# Patient Record
Sex: MALE | Race: AMERICAN INDIAN OR ALASKA NATIVE | Employment: STUDENT | URBAN - METROPOLITAN AREA
[De-identification: names, ages, dates, MRNs, and addresses within clinical notes are randomized per-mention and may not be internally consistent; named-entity substitution may affect disease eponyms.]

---

## 2017-03-12 RX ORDER — SIMVASTATIN 40 MG/1
TABLET, FILM COATED ORAL
Qty: 90 TAB | Refills: 2 | Status: SHIPPED | OUTPATIENT
Start: 2017-03-12 | End: 2017-08-24 | Stop reason: SDUPTHER

## 2017-03-21 ENCOUNTER — TELEPHONE (OUTPATIENT)
Dept: FAMILY MEDICINE CLINIC | Age: 52
End: 2017-03-21

## 2017-03-21 NOTE — TELEPHONE ENCOUNTER
Pt is calling to inform provider that he is scheduled to get knee injections at the beginning of April. Pt stated that the last time he had injections that his sugar went up and he wants to prevent this from happening again. So he would like to get a plan of action.

## 2017-03-23 NOTE — TELEPHONE ENCOUNTER
Spoke with patient and per [employer] advice, I informed him that after his injections he should closely monitor his blood sugar and that if it consistently stays above 200 to call Dr. Hansel Arellano. Patient verbally understood and states that injection is in April.

## 2017-04-20 ENCOUNTER — HOSPITAL ENCOUNTER (OUTPATIENT)
Dept: LAB | Age: 52
Discharge: HOME | End: 2017-04-20
Payer: OTHER GOVERNMENT

## 2017-04-20 ENCOUNTER — OFFICE VISIT (OUTPATIENT)
Dept: FAMILY MEDICINE CLINIC | Age: 52
End: 2017-04-20

## 2017-04-20 VITALS
DIASTOLIC BLOOD PRESSURE: 78 MMHG | BODY MASS INDEX: 25.62 KG/M2 | OXYGEN SATURATION: 94 % | HEIGHT: 76 IN | WEIGHT: 210.4 LBS | HEART RATE: 73 BPM | RESPIRATION RATE: 16 BRPM | SYSTOLIC BLOOD PRESSURE: 118 MMHG | TEMPERATURE: 98.1 F

## 2017-04-20 DIAGNOSIS — E78.00 HYPERCHOLESTEROLEMIA: ICD-10-CM

## 2017-04-20 DIAGNOSIS — I10 HTN (HYPERTENSION), BENIGN: ICD-10-CM

## 2017-04-20 DIAGNOSIS — E11.9 DIABETES MELLITUS WITHOUT COMPLICATION (HCC): Primary | ICD-10-CM

## 2017-04-20 DIAGNOSIS — E11.9 DIABETES MELLITUS WITHOUT COMPLICATION (HCC): ICD-10-CM

## 2017-04-20 LAB
ALT SERPL-CCNC: 27 U/L (ref 16–61)
ANION GAP BLD CALC-SCNC: 10 MMOL/L (ref 3–18)
AST SERPL W P-5'-P-CCNC: 11 U/L (ref 15–37)
BUN SERPL-MCNC: 12 MG/DL (ref 7–18)
BUN/CREAT SERPL: 16 (ref 12–20)
CALCIUM SERPL-MCNC: 9.6 MG/DL (ref 8.5–10.1)
CHLORIDE SERPL-SCNC: 103 MMOL/L (ref 100–108)
CHOLEST SERPL-MCNC: 159 MG/DL
CO2 SERPL-SCNC: 24 MMOL/L (ref 21–32)
CREAT SERPL-MCNC: 0.75 MG/DL (ref 0.6–1.3)
EST. AVERAGE GLUCOSE BLD GHB EST-MCNC: 272 MG/DL
GLUCOSE SERPL-MCNC: 249 MG/DL (ref 74–99)
HBA1C MFR BLD: 11.1 % (ref 4.2–5.6)
HDLC SERPL-MCNC: 33 MG/DL (ref 40–60)
HDLC SERPL: 4.8 {RATIO} (ref 0–5)
LDLC SERPL CALC-MCNC: 90 MG/DL (ref 0–100)
LIPID PROFILE,FLP: ABNORMAL
POTASSIUM SERPL-SCNC: 4.3 MMOL/L (ref 3.5–5.5)
SODIUM SERPL-SCNC: 137 MMOL/L (ref 136–145)
TRIGL SERPL-MCNC: 180 MG/DL (ref ?–150)
VLDLC SERPL CALC-MCNC: 36 MG/DL

## 2017-04-20 PROCEDURE — 80048 BASIC METABOLIC PNL TOTAL CA: CPT | Performed by: FAMILY MEDICINE

## 2017-04-20 PROCEDURE — 80061 LIPID PANEL: CPT | Performed by: FAMILY MEDICINE

## 2017-04-20 PROCEDURE — 84450 TRANSFERASE (AST) (SGOT): CPT | Performed by: FAMILY MEDICINE

## 2017-04-20 PROCEDURE — 84460 ALANINE AMINO (ALT) (SGPT): CPT | Performed by: FAMILY MEDICINE

## 2017-04-20 PROCEDURE — 83036 HEMOGLOBIN GLYCOSYLATED A1C: CPT | Performed by: FAMILY MEDICINE

## 2017-04-20 RX ORDER — MULTIVITAMIN
TABLET ORAL
COMMUNITY

## 2017-04-20 NOTE — PROGRESS NOTES
1. Have you been to the ER, urgent care clinic since your last visit? Hospitalized since your last visit? No    2. Have you seen or consulted any other health care providers outside of the [address] [place] [address] since your last visit? Include any pap smears or colon screening.  No

## 2017-04-20 NOTE — MR AVS SNAPSHOT
Visit Information Date & Time Provider Department Dept. Phone Encounter #  
 4/20/2017  9:00 AM Carla Bajwa, [address] [id number] Follow-up Instructions Return in about 4 months (around 8/20/2017) for htn/dm/chol. Upcoming Health Maintenance Date Due  
 EYE EXAM RETINAL OR DILATED Q1 12/4/2016 FOOT EXAM Q1 2/22/2017 HEMOGLOBIN A1C Q6M 6/22/2017 MICROALBUMIN Q1 12/22/2017 LIPID PANEL Q1 12/22/2017 COLONOSCOPY 1/29/2019 Pneumococcal 19-64 Highest Risk (3 of 3 - PPSV23) 10/4/2021 DTaP/Tdap/Td series (2 - Td) 4/22/2025 Allergies as of 4/20/2017  Review Complete On: 4/20/2017 By: Shamika Acevedo LPN No Known Allergies Current Immunizations  Reviewed on 10/22/2015 Name Date Influenza Vaccine 8/9/2015, 10/18/2014, 10/2/2013 Pneumococcal Conjugate (PCV-13) 6/22/2016 Tdap 4/22/2015 Zoster Vaccine, Live 8/9/2015 Not reviewed this visit You Were Diagnosed With   
  
 Codes Comments Diabetes mellitus without complication ([facility] [address]    -  Primary ICD-10-CM: E11.9 ICD-9-CM: 250.00 Hypercholesterolemia     ICD-10-CM: E78.00 ICD-9-CM: 272.0   
 HTN (hypertension), benign     ICD-10-CM: I10 
ICD-9-CM: 401.1 Vitals BP Pulse Temp Resp Height(growth percentile) Weight(growth percentile) 118/78 (BP 1 Location: Left arm, BP Patient Position: Sitting) 73 98.1 °F (36.7 °C) (Oral) 16 6' 4\" (1.93 m) 210 lb 6.4 oz (95.4 kg) SpO2 BMI Smoking Status 94% 25.61 kg/m2 Former Smoker BMI and BSA Data Body Mass Index Body Surface Area  
 25.61 kg/m 2 2.26 m 2 Preferred Pharmacy Pharmacy Name Phone [facility] [phone] Your Updated Medication List  
  
   
This list is accurate as of: 4/20/17  9:43 AM.  Always use your most recent med list.  
  
  
  
  
 aspirin delayed-release 81 mg tablet Take 1 Tab by mouth daily. Blood-Glucose Meter monitoring kit Blood sugar check daily CINNAMON 500 mg Cap Generic drug:  cinnamon bark Take  by mouth. FISH  mg Cap Generic drug:  Omega-3 Fatty Acids Take  by mouth.  
  
 gabapentin 300 mg capsule Commonly known as:  NEURONTIN Take 300 mg by mouth daily. glucose blood VI test strips strip Commonly known as:  ASCENSIA AUTODISC VI, ONE TOUCH ULTRA TEST VI Check blood glucose daily. HYDROcodone-acetaminophen 7.5-325 mg per tablet Commonly known as:  Marlaine Oliver Take 1 Tab by mouth four (4) times daily as needed for Pain. Max Daily Amount: 4 Tabs. Indications: PAIN  
  
 insulin glargine 100 unit/mL (3 mL) pen Commonly known as:  LANTUS SOLOSTAR  
30 units subcu once daily as directed ( per [place]) Insulin Needles (Disposable) 31 gauge x 3/16\" Ndle Commonly known as:  BD INSULIN PEN NEEDLE UF MINI To be used as directed with Solostar Lantus pen. * Lancets Misc Check blood glucose daily * Lancets Misc Blood sugar check daily  
  
 metFORMIN 500 mg tablet Commonly known as:  GLUCOPHAGE Take 1 Tab by mouth two (2) times daily (with meals). [address] Take  by mouth.  
  
 multivitamin tablet Commonly known as:  ONE A DAY Take 1 Tab by mouth daily. sildenafil citrate 50 mg tablet Commonly known as:  VIAGRA Take 1 Tab by mouth as needed. simvastatin 40 mg tablet Commonly known as:  ZOCOR  
TAKE 1 TABLET NIGHTLY * Notice: This list has 2 medication(s) that are the same as other medications prescribed for you. Read the directions carefully, and ask your doctor or other care provider to review them with you. Follow-up Instructions Return in about 4 months (around 8/20/2017) for htn/dm/chol. To-Do List   
 04/27/2017 Lab:  ALT   
  
 04/27/2017 Lab:  AST   
  
 04/27/2017   Lab:  HEMOGLOBIN A1C WITH EAG   
  
 04/27/2017 Lab:  LIPID PANEL   
  
 04/27/2017 Lab:  METABOLIC PANEL, BASIC Introducing [facility]! Dear Laureano Porras: 
Thank you for requesting a [employer] account. Our records indicate that you already have an active [employer] account. You can access your account anytime at [email or web address] Did you know that you can access your hospital and ER discharge instructions at any time in [employer]? You can also review all of your test results from your hospital stay or ER visit. Additional Information If you have questions, please visit the Frequently Asked Questions section of the [employer] website at [email or web address] Remember, [employer] is NOT to be used for urgent needs. For medical emergencies, dial 911. Now available from your iPhone and Android! Please provide this summary of care documentation to your next provider. Your primary care clinician is listed as [address]. If you have any questions after today's visit, please call [phone].

## 2017-04-22 NOTE — PROGRESS NOTES
HISTORY OF PRESENT ILLNESS  Grady Blair is a 46 y.o. male. HPI     Pt here to follow up on DM/Chol/HTN:  DM: He has noted that his blood sugars are more elevated recently. He has had some steroid injections for his back recently. Thinks this may have contributed to this; Pt has lost some weight:  Thinks he has changed his diet to promote this. Pt previously under the care of endocrine. Hyperchol:  Pt is on zocor; he tolerates med well; Pt complies with treatment regimen. HTN:  BP is stable; he is not on meds specifically for BP. Pt has no current sx relative to HTN. Current Outpatient Prescriptions:     cinnamon bark (CINNAMON) 500 mg cap, Take  by mouth., Disp: , Rfl:     simvastatin (ZOCOR) 40 mg tablet, TAKE 1 TABLET NIGHTLY, Disp: 90 Tab, Rfl: 2    gabapentin (NEURONTIN) 300 mg capsule, Take 300 mg by mouth daily. , Disp: , Rfl:     Blood-Glucose Meter monitoring kit, Blood sugar check daily, Disp: 1 Kit, Rfl: 0    glucose blood VI test strips (ASCENSIA AUTODISC VI, ONE TOUCH ULTRA TEST VI) strip, Check blood glucose daily. , Disp: 100 Strip, Rfl: 4    metFORMIN (GLUCOPHAGE) 500 mg tablet, Take 1 Tab by mouth two (2) times daily (with meals). , Disp: 180 Tab, Rfl: 3    insulin glargine (LANTUS SOLOSTAR) 100 unit/mL (3 mL) pen, 30 units subcu once daily as directed ( per Mathieu), Disp: 3 Each, Rfl: 3    aspirin delayed-release 81 mg tablet, Take 1 Tab by mouth daily. , Disp: 90 Tab, Rfl: 3    sildenafil citrate (VIAGRA) 50 mg tablet, Take 1 Tab by mouth as needed. , Disp: 4 Tab, Rfl: 6    Lancets misc, Blood sugar check daily, Disp: 1 Each, Rfl: 11    Insulin Needles, Disposable, (BD INSULIN PEN NEEDLE UF MINI) 31 gauge x 3/16\" ndle, To be used as directed with Solostar Lantus pen., Disp: 90 Pen Needle, Rfl: 3    GLUCOSAM/CHOND/HYALU/CF BORATE (MOVE FREE [facility] PO), Take  by mouth., Disp: , Rfl:     FISH  mg cap, Take  by mouth., Disp: , Rfl:     Lancets misc, Check blood glucose daily, Disp: 3 Package, Rfl: 3    multivitamin (ONE A DAY) tablet, Take 1 Tab by mouth daily. , Disp: , Rfl:       Review of Systems   Constitutional: Negative for chills and fever. Cardiovascular: Negative for chest pain and palpitations. Physical Exam   Constitutional: He appears well-developed and well-nourished. No distress. Cardiovascular: Normal rate and regular rhythm. Exam reveals no gallop and no friction rub. No murmur heard. Pulmonary/Chest: Breath sounds normal. No respiratory distress. He has no wheezes. He has no rales. Musculoskeletal: He exhibits no edema. Nursing note and vitals reviewed. ASSESSMENT and PLAN    ICD-10-CM ICD-9-CM    1. Diabetes mellitus without complication (HCC) P73.0 042.25 METABOLIC PANEL, BASIC      HEMOGLOBIN A1C WITH EAG   2. Hypercholesterolemia E78.00 272.0 ALT      AST      LIPID PANEL   3. HTN (hypertension), benign I10 401.1        As above,   above all stable unless otherwise noted   treatment plan as listed below  Orders Placed This Encounter    ALT    AST    LIPID PANEL    METABOLIC PANEL, BASIC    HEMOGLOBIN A1C WITH EAG    cinnamon bark (CINNAMON) 500 mg cap   Follow-up Disposition:  Return in about 4 months (around 8/20/2017) for htn/dm/chol. An After Visit Summary was printed and given to the patient. This has been fully explained to the patient, who indicates understanding. Addendum: pt not on ACE Inhibitor for uncertain reasons. Will have to add.

## 2017-08-24 ENCOUNTER — OFFICE VISIT (OUTPATIENT)
Dept: FAMILY MEDICINE CLINIC | Age: 52
End: 2017-08-24

## 2017-08-24 ENCOUNTER — HOSPITAL ENCOUNTER (OUTPATIENT)
Dept: LAB | Age: 52
Discharge: HOME | End: 2017-08-24
Payer: OTHER GOVERNMENT

## 2017-08-24 VITALS
OXYGEN SATURATION: 97 % | HEART RATE: 72 BPM | WEIGHT: 211 LBS | TEMPERATURE: 97.9 F | BODY MASS INDEX: 25.69 KG/M2 | RESPIRATION RATE: 16 BRPM | DIASTOLIC BLOOD PRESSURE: 80 MMHG | HEIGHT: 76 IN | SYSTOLIC BLOOD PRESSURE: 108 MMHG

## 2017-08-24 DIAGNOSIS — I10 HTN (HYPERTENSION) WITH GOAL TO BE DETERMINED: ICD-10-CM

## 2017-08-24 DIAGNOSIS — E11.9 DIABETES MELLITUS WITHOUT COMPLICATION (HCC): Primary | ICD-10-CM

## 2017-08-24 DIAGNOSIS — E11.9 DIABETES MELLITUS WITHOUT COMPLICATION (HCC): ICD-10-CM

## 2017-08-24 DIAGNOSIS — E78.00 HYPERCHOLESTEROLEMIA: ICD-10-CM

## 2017-08-24 LAB
ALT SERPL-CCNC: 26 U/L (ref 16–61)
ANION GAP SERPL CALC-SCNC: 8 MMOL/L (ref 3–18)
AST SERPL-CCNC: 16 U/L (ref 15–37)
BUN SERPL-MCNC: 16 MG/DL (ref 7–18)
BUN/CREAT SERPL: 19 (ref 12–20)
CALCIUM SERPL-MCNC: 9 MG/DL (ref 8.5–10.1)
CHLORIDE SERPL-SCNC: 107 MMOL/L (ref 100–108)
CHOLEST SERPL-MCNC: 139 MG/DL
CO2 SERPL-SCNC: 26 MMOL/L (ref 21–32)
CREAT SERPL-MCNC: 0.86 MG/DL (ref 0.6–1.3)
EST. AVERAGE GLUCOSE BLD GHB EST-MCNC: 169 MG/DL
GLUCOSE SERPL-MCNC: 96 MG/DL (ref 74–99)
HBA1C MFR BLD: 7.5 % (ref 4.2–5.6)
HDLC SERPL-MCNC: 42 MG/DL (ref 40–60)
HDLC SERPL: 3.3 {RATIO} (ref 0–5)
LDLC SERPL CALC-MCNC: 78 MG/DL (ref 0–100)
LIPID PROFILE,FLP: NORMAL
POTASSIUM SERPL-SCNC: 4.6 MMOL/L (ref 3.5–5.5)
SODIUM SERPL-SCNC: 141 MMOL/L (ref 136–145)
TRIGL SERPL-MCNC: 95 MG/DL (ref ?–150)
VLDLC SERPL CALC-MCNC: 19 MG/DL

## 2017-08-24 PROCEDURE — 80061 LIPID PANEL: CPT | Performed by: FAMILY MEDICINE

## 2017-08-24 PROCEDURE — 80048 BASIC METABOLIC PNL TOTAL CA: CPT | Performed by: FAMILY MEDICINE

## 2017-08-24 PROCEDURE — 84450 TRANSFERASE (AST) (SGOT): CPT | Performed by: FAMILY MEDICINE

## 2017-08-24 PROCEDURE — 83036 HEMOGLOBIN GLYCOSYLATED A1C: CPT | Performed by: FAMILY MEDICINE

## 2017-08-24 PROCEDURE — 84460 ALANINE AMINO (ALT) (SGPT): CPT | Performed by: FAMILY MEDICINE

## 2017-08-24 PROCEDURE — 36415 COLL VENOUS BLD VENIPUNCTURE: CPT | Performed by: FAMILY MEDICINE

## 2017-08-24 RX ORDER — METFORMIN HYDROCHLORIDE 500 MG/1
500 TABLET ORAL 2 TIMES DAILY WITH MEALS
Qty: 180 TAB | Refills: 3 | Status: SHIPPED | OUTPATIENT
Start: 2017-08-24 | End: 2018-08-22 | Stop reason: SDUPTHER

## 2017-08-24 RX ORDER — ASPIRIN 81 MG/1
81 TABLET ORAL DAILY
Qty: 90 TAB | Refills: 3 | Status: SHIPPED | OUTPATIENT
Start: 2017-08-24 | End: 2018-07-20 | Stop reason: SDUPTHER

## 2017-08-24 RX ORDER — PEN NEEDLE, DIABETIC 31 GX3/16"
NEEDLE, DISPOSABLE MISCELLANEOUS
Qty: 90 PEN NEEDLE | Refills: 3 | Status: SHIPPED | OUTPATIENT
Start: 2017-08-24 | End: 2018-07-20 | Stop reason: SDUPTHER

## 2017-08-24 RX ORDER — INSULIN GLARGINE 100 [IU]/ML
35 INJECTION, SOLUTION SUBCUTANEOUS
Qty: 3 UNITS | Refills: 6 | Status: SHIPPED | OUTPATIENT
Start: 2017-08-24 | End: 2018-01-24 | Stop reason: ALTCHOICE

## 2017-08-24 RX ORDER — SILDENAFIL 50 MG/1
50 TABLET, FILM COATED ORAL AS NEEDED
Qty: 4 TAB | Refills: 6 | Status: SHIPPED | OUTPATIENT
Start: 2017-08-24 | End: 2018-07-20 | Stop reason: SDUPTHER

## 2017-08-24 RX ORDER — SIMVASTATIN 40 MG/1
TABLET, FILM COATED ORAL
Qty: 90 TAB | Refills: 3 | Status: SHIPPED | OUTPATIENT
Start: 2017-08-24 | End: 2018-08-22 | Stop reason: SDUPTHER

## 2017-08-24 NOTE — PROGRESS NOTES
1. Have you been to the ER, urgent care clinic since your last visit? Hospitalized since your last visit? No    2. Have you seen or consulted any other health care providers outside of the [address] [place] [address] since your last visit? Include any pap smears or colon screening.  No

## 2017-08-24 NOTE — PROGRESS NOTES
HISTORY OF PRESENT ILLNESS  Rehana Barnes is a 46 y.o. male. HPI  Patient is here today for evaluation and treatment of: Diabetes/Hypertension/Cholesterol problem    Diabetes: Pt 's insulin dose was increased to 35 units at last visit. Lab Results   Component Value Date/Time    Hemoglobin A1c 11.1 04/20/2017 09:49 AM    Hemoglobin A1c, External 5.9 12/18/2014          Hypertension:  BP is stable; continues on meds as listed below; Cholesterol:  Pt is on zocor; attempts a lower cholesterol diet     He did drop a heavy object on his left great toe; toe is bruised but there is no current pain; He is able to move the toe without any pain or difficulty. Current Outpatient Prescriptions:     aspirin delayed-release 81 mg tablet, Take 1 Tab by mouth daily. , Disp: 90 Tab, Rfl: 3    insulin glargine (LANTUS,BASAGLAR) 100 unit/mL (3 mL) inpn, 35 Units by SubCUTAneous route nightly., Disp: 3 Units, Rfl: 6    Insulin Needles, Disposable, (BD INSULIN PEN NEEDLE UF MINI) 31 gauge x 3/16\" ndle, To be used as directed with Solostar Lantus pen., Disp: 90 Pen Needle, Rfl: 3    simvastatin (ZOCOR) 40 mg tablet, TAKE 1 TABLET NIGHTLY, Disp: 90 Tab, Rfl: 3    sildenafil citrate (VIAGRA) 50 mg tablet, Take 1 Tab by mouth as needed. , Disp: 4 Tab, Rfl: 6    metFORMIN (GLUCOPHAGE) 500 mg tablet, Take 1 Tab by mouth two (2) times daily (with meals). , Disp: 180 Tab, Rfl: 3    cinnamon bark (CINNAMON) 500 mg cap, Take  by mouth., Disp: , Rfl:     gabapentin (NEURONTIN) 300 mg capsule, Take 300 mg by mouth daily. , Disp: , Rfl:     Blood-Glucose Meter monitoring kit, Blood sugar check daily, Disp: 1 Kit, Rfl: 0    glucose blood VI test strips (ASCENSIA AUTODISC VI, ONE TOUCH ULTRA TEST VI) strip, Check blood glucose daily. , Disp: 100 Strip, Rfl: 4    GLUCOSAM/CHOND/HYALU/CF BORATE (MOVE FREE [facility] PO), Take  by mouth., Disp: , Rfl:     FISH  mg cap, Take  by mouth., Disp: , Rfl:     Lancets misc, Check blood glucose daily, Disp: 3 Package, Rfl: 3    multivitamin (ONE A DAY) tablet, Take 1 Tab by mouth daily. , Disp: , Rfl:     Lancets misc, Blood sugar check daily, Disp: 1 Each, Rfl: 11    PMH,  Meds, Allergies, Family History, Social history reviewed    Review of Systems   Constitutional: Negative for chills and fever. Cardiovascular: Negative for chest pain and palpitations. Psychiatric/Behavioral: Negative for depression and suicidal ideas. Physical Exam   Constitutional: He appears well-developed and well-nourished. No distress. HENT:   Head: Normocephalic and atraumatic. Cardiovascular: Normal rate and regular rhythm. Exam reveals no gallop and no friction rub. No murmur heard. Pulmonary/Chest: Breath sounds normal. No respiratory distress. He has no wheezes. He has no rales. Musculoskeletal: He exhibits edema (some mild edema of left great toe; some ecchymosis; ROM intact; ). He exhibits no tenderness (in the great toe). Psychiatric: He has a normal mood and affect. His behavior is normal.   Nursing note and vitals reviewed. Sensory exam of the foot is normal, tested with the monofilament. Good pulses, no lesions or ulcers, good peripheral pulses. Visit Vitals    /80 (BP 1 Location: Left arm, BP Patient Position: Sitting)    Pulse 72    Temp 97.9 °F (36.6 °C) (Oral)    Resp 16    Ht 6' 4\" (1.93 m)    Wt 211 lb (95.7 kg)    SpO2 97%    BMI 25.68 kg/m2         ASSESSMENT and PLAN    ICD-10-CM ICD-9-CM    1. Diabetes mellitus without complication (HCC) H16.5 250.00  DIABETES FOOT EXAM      HEMOGLOBIN A1C WITH EAG   2. Hypercholesterolemia E78.00 272.0 ALT      AST      LIPID PANEL   3. HTN (hypertension) with goal to be determined J51 261.3 METABOLIC PANEL, BASIC       As above,   above all stable unless otherwise noted  Labs as ordered  Continue current meds as ordered  Follow-up Disposition:  Return in about 5 months (around 1/24/2018) for dm/htn/chol.   An After Visit Summary was printed and given to the patient. This has been fully explained to the patient, who indicates understanding.

## 2017-08-24 NOTE — MR AVS SNAPSHOT
Visit Information Date & Time Provider Department Dept. Phone Encounter #  
 8/24/2017  9:00 AM Guillermina Camacho, [address] [id number] Follow-up Instructions Return in about 5 months (around 1/24/2018) for dm/htn/chol. Upcoming Health Maintenance Date Due INFLUENZA AGE 9 TO ADULT 8/1/2017 HEMOGLOBIN A1C Q6M 10/20/2017 EYE EXAM RETINAL OR DILATED Q1 11/11/2017 MICROALBUMIN Q1 12/22/2017 LIPID PANEL Q1 4/20/2018 FOOT EXAM Q1 8/24/2018 COLONOSCOPY 1/29/2019 Pneumococcal 19-64 Highest Risk (3 of 3 - PPSV23) 10/4/2021 DTaP/Tdap/Td series (2 - Td) 4/22/2025 Allergies as of 8/24/2017  Review Complete On: 8/24/2017 By: Yanelis Cortés LPN No Known Allergies Current Immunizations  Reviewed on 10/22/2015 Name Date Influenza Vaccine 8/9/2015, 10/18/2014, 10/2/2013 Pneumococcal Conjugate (PCV-13) 6/22/2016 Tdap 4/22/2015 Zoster Vaccine, Live 8/9/2015 Not reviewed this visit You Were Diagnosed With   
  
 Codes Comments Diabetes mellitus without complication ([facility]    -  Primary ICD-10-CM: E11.9 ICD-9-CM: 250.00 Hypercholesterolemia     ICD-10-CM: E78.00 ICD-9-CM: 272.0   
 HTN (hypertension) with goal to be determined     ICD-10-CM: I10 
ICD-9-CM: 401.9 Vitals BP Pulse Temp Resp Height(growth percentile) Weight(growth percentile) 108/80 (BP 1 Location: Left arm, BP Patient Position: Sitting) 72 97.9 °F (36.6 °C) (Oral) 16 6' 4\" (1.93 m) 211 lb (95.7 kg) SpO2 BMI Smoking Status 97% 25.68 kg/m2 Former Smoker BMI and BSA Data Body Mass Index Body Surface Area  
 25.68 kg/m 2 2.27 m 2 Preferred Pharmacy Pharmacy Name Phone [address] [facility] [phone] Your Updated Medication List  
  
   
This list is accurate as of: 8/24/17 10:04 AM.  Always use your most recent med list.  
  
  
  
  
 aspirin delayed-release 81 mg tablet Take 1 Tab by mouth daily. Blood-Glucose Meter monitoring kit Blood sugar check daily CINNAMON 500 mg Cap Generic drug:  cinnamon bark Take  by mouth. FISH  mg Cap Generic drug:  Omega-3 Fatty Acids Take  by mouth.  
  
 gabapentin 300 mg capsule Commonly known as:  NEURONTIN Take 300 mg by mouth daily. glucose blood VI test strips strip Commonly known as:  ASCENSIA AUTODISC VI, ONE TOUCH ULTRA TEST VI Check blood glucose daily. insulin glargine 100 unit/mL (3 mL) Inpn Commonly known as:  LANTUS,BASAGLAR  
35 Units by SubCUTAneous route nightly. Insulin Needles (Disposable) 31 gauge x 3/16\" Ndle Commonly known as:  BD INSULIN PEN NEEDLE UF MINI To be used as directed with Solostar Lantus pen. * Lancets Misc Check blood glucose daily * Lancets Misc Blood sugar check daily  
  
 metFORMIN 500 mg tablet Commonly known as:  GLUCOPHAGE Take 1 Tab by mouth two (2) times daily (with meals). [address] Take  by mouth.  
  
 multivitamin tablet Commonly known as:  ONE A DAY Take 1 Tab by mouth daily. sildenafil citrate 50 mg tablet Commonly known as:  VIAGRA Take 1 Tab by mouth as needed. simvastatin 40 mg tablet Commonly known as:  ZOCOR  
TAKE 1 TABLET NIGHTLY * Notice: This list has 2 medication(s) that are the same as other medications prescribed for you. Read the directions carefully, and ask your doctor or other care provider to review them with you. Prescriptions Printed Refills  
 aspirin delayed-release 81 mg tablet 3 Sig: Take 1 Tab by mouth daily. Class: Print Route: Oral  
 insulin glargine (LANTUS,BASAGLAR) 100 unit/mL (3 mL) inpn 6 Si Units by SubCUTAneous route nightly. Class: Print Route: SubCUTAneous Insulin Needles, Disposable, (BD INSULIN PEN NEEDLE UF MINI) 31 gauge x 3/16\" ndle 3 Sig: To be used as directed with Solostar Lantus pen. Class: Print  
 sildenafil citrate (VIAGRA) 50 mg tablet 6 Sig: Take 1 Tab by mouth as needed. Class: Print Route: Oral  
  
Prescriptions Sent to Pharmacy Refills  
 simvastatin (ZOCOR) 40 mg tablet 3 Sig: TAKE 1 TABLET NIGHTLY Class: Normal  
 Pharmacy: [address], [facility] Ph #: [phone]  
 metFORMIN (GLUCOPHAGE) 500 mg tablet 3 Sig: Take 1 Tab by mouth two (2) times daily (with meals). Class: Normal  
 Pharmacy: [address], [address] [place] [address] Ph #: [phone] Route: Oral  
  
We Performed the Following  DIABETES FOOT EXAM [[facility]] Follow-up Instructions Return in about 5 months (around 1/24/2018) for dm/htn/chol. To-Do List   
 08/24/2017 Lab:  ALT   
  
 08/24/2017 Lab:  AST   
  
 08/24/2017 Lab:  HEMOGLOBIN A1C WITH EAG   
  
 08/24/2017 Lab:  LIPID PANEL   
  
 08/24/2017 Lab:  METABOLIC PANEL, BASIC Patient Instructions Learning About Meal Planning for Diabetes Why plan your meals? Meal planning can be a key part of managing diabetes. Planning meals and snacks with the right balance of carbohydrate, protein, and fat can help you keep your blood sugar at the target level you set with your doctor. You don't have to eat special foods. You can eat what your family eats, including sweets once in a while. But you do have to pay attention to how often you eat and how much you eat of certain foods. You may want to work with a dietitian or a certified diabetes educator. He or she can give you tips and meal ideas and can answer your questions about meal planning. This health professional can also help you reach a healthy weight if that is one of your goals. What plan is right for you? Your dietitian or diabetes educator may suggest that you start with the plate format or carbohydrate counting. The plate format The plate format is a simple way to help you manage how you eat. You plan meals by learning how much space each food should take on a plate. Using the plate format helps you spread carbohydrate throughout the day. It can make it easier to keep your blood sugar level within your target range. It also helps you see if you're eating healthy portion sizes. To use the plate format, you put non-starchy vegetables on half your plate. Add meat or meat substitutes on one-quarter of the plate. Put a grain or starchy vegetable (such as brown rice or a potato) on the final quarter of the plate. You can add a small piece of fruit and some low-fat or fat-free milk or yogurt, depending on your carbohydrate goal for each meal. 
Here are some tips for using the plate format: · Make sure that you are not using an oversized plate. A 9-inch plate is best. Many restaurants use larger plates. · Get used to using the plate format at home. Then you can use it when you eat out. · Write down your questions about using the plate format. Talk to your doctor, a dietitian, or a diabetes educator about your concerns. Carbohydrate counting With carbohydrate counting, you plan meals based on the amount of carbohydrate in each food. Carbohydrate raises blood sugar higher and more quickly than any other nutrient. It is found in desserts, breads and cereals, and fruit. It's also found in starchy vegetables such as potatoes and corn, grains such as rice and pasta, and milk and yogurt. Spreading carbohydrate throughout the day helps keep your blood sugar levels within your target range. Your daily amount depends on several things, including your weight, how active you are, which diabetes medicines you take, and what your goals are for your blood sugar levels.  A registered dietitian or diabetes educator can help you plan how much carbohydrate to include in each meal and snack. A guideline for your daily amount of carbohydrate is: · 45 to 60 grams at each meal. That's about the same as 3 to 4 carbohydrate servings. · 15 to 20 grams at each snack. That's about the same as 1 carbohydrate serving. The Nutrition Facts label on packaged foods tells you how much carbohydrate is in a serving of the food. First, look at the serving size on the food label. Is that the amount you eat in a serving? All of the nutrition information on a food label is based on that serving size. So if you eat more or less than that, you'll need to adjust the other numbers. Total carbohydrate is the next thing you need to look for on the label. If you count carbohydrate servings, one serving of carbohydrate is 15 grams. For foods that don't come with labels, such as fresh fruits and vegetables, you'll need a guide that lists carbohydrate in these foods. Ask your doctor, dietitian, or diabetes educator about books or other nutrition guides you can use. If you take insulin, you need to know how many grams of carbohydrate are in a meal. This lets you know how much rapid-acting insulin to take before you eat. If you use an insulin pump, you get a constant rate of insulin during the day. So the pump must be programmed at meals to give you extra insulin to cover the rise in blood sugar after meals. When you know how much carbohydrate you will eat, you can take the right amount of insulin. Or, if you always use the same amount of insulin, you need to make sure that you eat the same amount of carbohydrate at meals. If you need more help to understand carbohydrate counting and food labels, ask your doctor, dietitian, or diabetes educator. How do you get started with meal planning? Here are some tips to get started: 
· Plan your meals a week at a time. Don't forget to include snacks too. · Use cookbooks or online recipes to plan several main meals. Plan some quick meals for busy nights. You also can double some recipes that freeze well. Then you can save half for other busy nights when you don't have time to cook. · Make sure you have the ingredients you need for your recipes. If you're running low on basic items, put these items on your shopping list too. · List foods that you use to make breakfasts, lunches, and snacks. List plenty of fruits and vegetables. · Post this list on the refrigerator. Add to it as you think of more things you need. · Take the list to the store to do your weekly shopping. Follow-up care is a key part of your treatment and safety. Be sure to make and go to all appointments, and call your doctor if you are having problems. It's also a good idea to know your test results and keep a list of the medicines you take. Where can you learn more? Go to http://marie-whitney.info/. Héctor Ghosh in the search box to learn more about \"Learning About Meal Planning for Diabetes. \" Current as of: March 13, 2017 Content Version: 11.3 © 0975-7181 [employer]. Care instructions adapted under license by [employer] (which disclaims liability or warranty for this information). If you have questions about a medical condition or this instruction, always ask your healthcare professional. [place] [address] any warranty or liability for your use of this information. Introducing [facility]! Dear Kim Herron: 
Thank you for requesting a [employer] account. Our records indicate that you already have an active [employer] account. You can access your account anytime at [email or web address] Did you know that you can access your hospital and ER discharge instructions at any time in [employer]? You can also review all of your test results from your hospital stay or ER visit. Additional Information If you have questions, please visit the Frequently Asked Questions section of the [employer] website at [email or web address] Remember, [employer] is NOT to be used for urgent needs. For medical emergencies, dial 911. Now available from your iPhone and Android! Please provide this summary of care documentation to your next provider. Your primary care clinician is listed as [facility]. If you have any questions after today's visit, please call [phone].

## 2017-08-24 NOTE — PATIENT INSTRUCTIONS

## 2018-01-24 ENCOUNTER — OFFICE VISIT (OUTPATIENT)
Dept: FAMILY MEDICINE CLINIC | Age: 53
End: 2018-01-24

## 2018-01-24 VITALS
WEIGHT: 201 LBS | HEART RATE: 85 BPM | TEMPERATURE: 98.1 F | RESPIRATION RATE: 16 BRPM | BODY MASS INDEX: 24.48 KG/M2 | SYSTOLIC BLOOD PRESSURE: 110 MMHG | DIASTOLIC BLOOD PRESSURE: 70 MMHG | OXYGEN SATURATION: 96 % | HEIGHT: 76 IN

## 2018-01-24 DIAGNOSIS — E78.00 HYPERCHOLESTEROLEMIA: ICD-10-CM

## 2018-01-24 DIAGNOSIS — E11.9 DIABETES MELLITUS WITHOUT COMPLICATION (HCC): Primary | ICD-10-CM

## 2018-01-24 DIAGNOSIS — I10 ESSENTIAL HYPERTENSION: ICD-10-CM

## 2018-01-24 RX ORDER — INSULIN GLARGINE 100 [IU]/ML
INJECTION, SOLUTION SUBCUTANEOUS
Qty: 3 ADJUSTABLE DOSE PRE-FILLED PEN SYRINGE | Refills: 6
Start: 2018-01-24 | End: 2018-07-20 | Stop reason: SDUPTHER

## 2018-01-24 NOTE — PATIENT INSTRUCTIONS
Type 2 Diabetes: Care Instructions  Your Care Instructions    Type 2 diabetes is a disease that develops when the body's tissues cannot use insulin properly. Over time, the pancreas cannot make enough insulin. Insulin is a hormone that helps the body's cells use sugar (glucose) for energy. It also helps the body store extra sugar in muscle, fat, and liver cells. Without insulin, the sugar cannot get into the cells to do its work. It stays in the blood instead. This can cause high blood sugar levels. A person has diabetes when the blood sugar stays too high too much of the time. Over time, diabetes can lead to diseases of the heart, blood vessels, nerves, kidneys, and eyes. You may be able to control your blood sugar by losing weight, eating a healthy diet, and getting daily exercise. You may also have to take insulin or other diabetes medicine. Follow-up care is a key part of your treatment and safety. Be sure to make and go to all appointments. Call your doctor if you are having problems. It's also a good idea to know your test results and keep a list of the medicines you take. How can you care for yourself at home? · Keep your blood sugar at a target level (which you set with your doctor). ¨ Eat a good diet that spreads carbohydrate throughout the day. Carbohydrate-the body's main source of fuel-affects blood sugar more than any other nutrient. Carbohydrate is in fruits, vegetables, milk, and yogurt. It also is in breads, cereals, vegetables such as potatoes and corn, and sugary foods such as candy and cakes. ¨ Aim for 30 minutes of exercise on most, preferably all, days of the week. Walking is a good choice. You also may want to do other activities, such as running, swimming, cycling, or playing tennis or team sports. If your doctor says it's okay, do muscle-strengthening exercises at least 2 times a week. ¨ Take your medicines exactly as prescribed.  Call your doctor if you think you are having a problem with your medicine. You will get more details on the specific medicines your doctor prescribes. · Check your blood sugar as often as your doctor recommends. It is important to keep track of any symptoms you have, such as low blood sugar. Also tell your doctor if you have any changes in your activities, diet, or insulin use. · Talk to your doctor before you start taking aspirin every day. Aspirin can help certain people lower their risk of a heart attack or stroke. But taking aspirin isn't right for everyone, because it can cause serious bleeding. · Do not smoke. If you need help quitting, talk to your doctor about stop-smoking programs and medicines. These can increase your chances of quitting for good. · Keep your cholesterol and blood pressure at normal levels. You may need to take one or more medicines to reach your goals. Take them exactly as directed. Do not stop or change a medicine without talking to your doctor first.  When should you call for help? Call 911 anytime you think you may need emergency care. For example, call if:  ? · You passed out (lost consciousness), or you suddenly become very sleepy or confused. (You may have very low blood sugar.)   ? Call your doctor now or seek immediate medical care if:  ? · Your blood sugar is 300 mg/dL or is higher than the level your doctor has set for you. ? · You have symptoms of low blood sugar, such as:  ¨ Sweating. ¨ Feeling nervous, shaky, and weak. ¨ Extreme hunger and slight nausea. ¨ Dizziness and headache. ¨ Blurred vision. ¨ Confusion. ? Watch closely for changes in your health, and be sure to contact your doctor if:  ? · You often have problems controlling your blood sugar. ? · You have symptoms of long-term diabetes problems, such as:  ¨ New vision changes. ¨ New pain, numbness, or tingling in your hands or feet. ¨ Skin problems. Where can you learn more? Go to http://marie-whitney.info/.   Enter C553 in the search box to learn more about \"Type 2 Diabetes: Care Instructions. \"  Current as of: March 13, 2017  Content Version: 11.4  © 2488-3384 [employer]. Care instructions adapted under license by [employer] (which disclaims liability or warranty for this information). If you have questions about a medical condition or this instruction, always ask your healthcare professional. [place] [address] any warranty or liability for your use of this information.

## 2018-01-24 NOTE — MR AVS SNAPSHOT
[address] [place] [address] 
 
 
 [address] [address] [address] [address] 
[phone] Patient: Andriy Goodman MRN: [id number] :1965 Visit Information Date & Time Provider Department Dept. Phone Encounter #  
 2018  3:00 PM Dennis Lancaster [address] [place] [address] [phone] [id number] Upcoming Health Maintenance Date Due Influenza Age 5 to Adult 2017 EYE EXAM RETINAL OR DILATED Q1 2017 MICROALBUMIN Q1 2017 HEMOGLOBIN A1C Q6M 2018 FOOT EXAM Q1 2018 LIPID PANEL Q1 2018 Pneumococcal 19-64 Highest Risk (3 of 3 - PPSV23) 10/4/2021 DTaP/Tdap/Td series (2 - Td) 2025 Allergies as of 2018  Review Complete On: 2018 By: Dennis Lancaster MD  
 No Known Allergies Current Immunizations  Reviewed on 10/22/2015 Name Date Influenza Vaccine 2015, 10/18/2014, 10/2/2013 Pneumococcal Conjugate (PCV-13) 2016 Tdap 2015 Zoster Vaccine, Live 2015 Not reviewed this visit You Were Diagnosed With   
  
 Codes Comments Diabetes mellitus without complication ([facility]    -  Primary ICD-10-CM: E11.9 ICD-9-CM: 250.00 Hypercholesterolemia     ICD-10-CM: E78.00 ICD-9-CM: 272.0 Essential hypertension     ICD-10-CM: I10 
ICD-9-CM: 401.9 Vitals BP Pulse Temp Resp Height(growth percentile) Weight(growth percentile) 110/70 85 98.1 °F (36.7 °C) (Oral) 16 6' 4\" (1.93 m) 201 lb (91.2 kg) SpO2 BMI Smoking Status 96% 24.47 kg/m2 Former Smoker Vitals History BMI and BSA Data Body Mass Index Body Surface Area  
 24.47 kg/m 2 2.21 m 2 Preferred Pharmacy Pharmacy Name Phone [address] [facility] [phone] Your Updated Medication List  
  
   
This list is accurate as of: 18  4:10 PM.  Always use your most recent med list.  
  
  
  
  
 aspirin delayed-release 81 mg tablet Take 1 Tab by mouth daily. Blood-Glucose Meter monitoring kit Blood sugar check daily CINNAMON 500 mg Cap Generic drug:  cinnamon bark Take  by mouth. FISH  mg Cap Generic drug:  Omega-3 Fatty Acids Take  by mouth.  
  
 gabapentin 300 mg capsule Commonly known as:  NEURONTIN Take 300 mg by mouth daily. glucose blood VI test strips strip Commonly known as:  ASCENSIA AUTODISC VI, ONE TOUCH ULTRA TEST VI Check blood glucose daily. insulin glargine 100 unit/mL (3 mL) Inpn Commonly known as:  LANTUS,BASAGLAR  
35 units subcu once daily as directed ( per [place]) Insulin Needles (Disposable) 31 gauge x 3/16\" Ndle Commonly known as:  BD INSULIN PEN NEEDLE UF MINI To be used as directed with Solostar Lantus pen. * Lancets Misc Check blood glucose daily * Lancets Misc Blood sugar check daily  
  
 metFORMIN 500 mg tablet Commonly known as:  GLUCOPHAGE Take 1 Tab by mouth two (2) times daily (with meals). [address] Take  by mouth.  
  
 multivitamin tablet Commonly known as:  ONE A DAY Take 1 Tab by mouth daily. sildenafil citrate 50 mg tablet Commonly known as:  VIAGRA Take 1 Tab by mouth as needed. simvastatin 40 mg tablet Commonly known as:  ZOCOR  
TAKE 1 TABLET NIGHTLY * Notice: This list has 2 medication(s) that are the same as other medications prescribed for you. Read the directions carefully, and ask your doctor or other care provider to review them with you. To-Do List   
 01/31/2018 Lab:  ALT   
  
 01/31/2018 Lab:  AST   
  
 01/31/2018 Lab:  HEMOGLOBIN A1C WITH EAG   
  
 01/31/2018 Lab:  LIPID PANEL   
  
 01/31/2018 Lab:  METABOLIC PANEL, BASIC   
  
 01/31/2018 Lab:  MICROALBUMIN, UR, RAND W/ MICROALBUMIN/CREA RATIO Patient Instructions Type 2 Diabetes: Care Instructions Your Care Instructions Type 2 diabetes is a disease that develops when the body's tissues cannot use insulin properly. Over time, the pancreas cannot make enough insulin. Insulin is a hormone that helps the body's cells use sugar (glucose) for energy. It also helps the body store extra sugar in muscle, fat, and liver cells. Without insulin, the sugar cannot get into the cells to do its work. It stays in the blood instead. This can cause high blood sugar levels. A person has diabetes when the blood sugar stays too high too much of the time. Over time, diabetes can lead to diseases of the heart, blood vessels, nerves, kidneys, and eyes. You may be able to control your blood sugar by losing weight, eating a healthy diet, and getting daily exercise. You may also have to take insulin or other diabetes medicine. Follow-up care is a key part of your treatment and safety. Be sure to make and go to all appointments. Call your doctor if you are having problems. It's also a good idea to know your test results and keep a list of the medicines you take. How can you care for yourself at home? · Keep your blood sugar at a target level (which you set with your doctor). ¨ Eat a good diet that spreads carbohydrate throughout the day. Carbohydrate-the body's main source of fuel-affects blood sugar more than any other nutrient. Carbohydrate is in fruits, vegetables, milk, and yogurt. It also is in breads, cereals, vegetables such as potatoes and corn, and sugary foods such as candy and cakes. ¨ Aim for 30 minutes of exercise on most, preferably all, days of the week. Walking is a good choice. You also may want to do other activities, such as running, swimming, cycling, or playing tennis or team sports. If your doctor says it's okay, do muscle-strengthening exercises at least 2 times a week. ¨ Take your medicines exactly as prescribed.  Call your doctor if you think you are having a problem with your medicine. You will get more details on the specific medicines your doctor prescribes. · Check your blood sugar as often as your doctor recommends. It is important to keep track of any symptoms you have, such as low blood sugar. Also tell your doctor if you have any changes in your activities, diet, or insulin use. · Talk to your doctor before you start taking aspirin every day. Aspirin can help certain people lower their risk of a heart attack or stroke. But taking aspirin isn't right for everyone, because it can cause serious bleeding. · Do not smoke. If you need help quitting, talk to your doctor about stop-smoking programs and medicines. These can increase your chances of quitting for good. · Keep your cholesterol and blood pressure at normal levels. You may need to take one or more medicines to reach your goals. Take them exactly as directed. Do not stop or change a medicine without talking to your doctor first. 
When should you call for help? Call 911 anytime you think you may need emergency care. For example, call if: 
? · You passed out (lost consciousness), or you suddenly become very sleepy or confused. (You may have very low blood sugar.) ? Call your doctor now or seek immediate medical care if: 
? · Your blood sugar is 300 mg/dL or is higher than the level your doctor has set for you. ? · You have symptoms of low blood sugar, such as: ¨ Sweating. ¨ Feeling nervous, shaky, and weak. ¨ Extreme hunger and slight nausea. ¨ Dizziness and headache. ¨ Blurred vision. ¨ Confusion. ? Watch closely for changes in your health, and be sure to contact your doctor if: 
? · You often have problems controlling your blood sugar. ? · You have symptoms of long-term diabetes problems, such as: ¨ New vision changes. ¨ New pain, numbness, or tingling in your hands or feet. ¨ Skin problems. Where can you learn more? Go to http://marie-whitney.info/. Enter C553 in the search box to learn more about \"Type 2 Diabetes: Care Instructions. \" Current as of: March 13, 2017 Content Version: 11.4 © 3852-5686 [employer]. Care instructions adapted under license by [employer] (which disclaims liability or warranty for this information). If you have questions about a medical condition or this instruction, always ask your healthcare professional. [address] any warranty or liability for your use of this information. Introducing [facility]! Dear Mary Gamboa: 
Thank you for requesting a KeyMe account. Our records indicate that you already have an active KeyMe account. You can access your account anytime at [email or web address] Did you know that you can access your hospital and ER discharge instructions at any time in KeyMe? You can also review all of your test results from your hospital stay or ER visit. Additional Information If you have questions, please visit the Frequently Asked Questions section of the KeyMe website at [email or web address] Remember, KeyMe is NOT to be used for urgent needs. For medical emergencies, dial 911. Now available from your iPhone and Android! Please provide this summary of care documentation to your next provider. Your primary care clinician is listed as [address]. If you have any questions after today's visit, please call [phone].

## 2018-01-24 NOTE — PROGRESS NOTES
HISTORY OF PRESENT ILLNESS  Hoa Kim is a 46 y.o. male. HPI  Patient is here today for evaluation and treatment of: Diabetes/Hypertension/Cholesterol problem    Diabetes:  He continues on insulin, metformin ; tolerates med well; he is compliant with regimen. Hypertension:  BP is stable; he is on meds as listed below; Cholesterol: attempts a lower cholesterol diet. He stays active; he is not fasting today; last lipid profile is as below. He has no new complaints       Current Outpatient Prescriptions:     aspirin delayed-release 81 mg tablet, Take 1 Tab by mouth daily. , Disp: 90 Tab, Rfl: 3    insulin glargine (LANTUS,BASAGLAR) 100 unit/mL (3 mL) inpn, 35 Units by SubCUTAneous route nightly., Disp: 3 Units, Rfl: 6    Insulin Needles, Disposable, (BD INSULIN PEN NEEDLE UF MINI) 31 gauge x 3/16\" ndle, To be used as directed with Solostar Lantus pen., Disp: 90 Pen Needle, Rfl: 3    simvastatin (ZOCOR) 40 mg tablet, TAKE 1 TABLET NIGHTLY, Disp: 90 Tab, Rfl: 3    sildenafil citrate (VIAGRA) 50 mg tablet, Take 1 Tab by mouth as needed. , Disp: 4 Tab, Rfl: 6    metFORMIN (GLUCOPHAGE) 500 mg tablet, Take 1 Tab by mouth two (2) times daily (with meals). , Disp: 180 Tab, Rfl: 3    cinnamon bark (CINNAMON) 500 mg cap, Take  by mouth., Disp: , Rfl:     gabapentin (NEURONTIN) 300 mg capsule, Take 300 mg by mouth daily. , Disp: , Rfl:     Blood-Glucose Meter monitoring kit, Blood sugar check daily, Disp: 1 Kit, Rfl: 0    glucose blood VI test strips (ASCENSIA AUTODISC VI, ONE TOUCH ULTRA TEST VI) strip, Check blood glucose daily. , Disp: 100 Strip, Rfl: 4    Lancets misc, Blood sugar check daily, Disp: 1 Each, Rfl: 11    GLUCOSAM/CHOND/HYALU/CF BORATE (MOVE FREE JOINT HEALTH PO), Take  by mouth., Disp: , Rfl:     FISH  mg cap, Take  by mouth., Disp: , Rfl:     Lancets misc, Check blood glucose daily, Disp: 3 Package, Rfl: 3    multivitamin (ONE A DAY) tablet, Take 1 Tab by mouth daily. , Disp: , Rfl:     Review of Systems   Constitutional: Negative for chills and fever. Cardiovascular: Negative for chest pain and palpitations. Physical Exam   Constitutional: He appears well-developed and well-nourished. No distress. Cardiovascular: Normal rate and regular rhythm. Exam reveals no gallop and no friction rub. No murmur heard. Pulmonary/Chest: Breath sounds normal. No respiratory distress. He has no wheezes. He has no rales. Musculoskeletal: He exhibits no edema. Nursing note and vitals reviewed. Visit Vitals    /75 (BP 1 Location: Right arm, BP Patient Position: Sitting)    Pulse 85    Temp 98.1 °F (36.7 °C) (Oral)    Resp 16    Ht 6' 4\" (1.93 m)    Wt 201 lb (91.2 kg)    SpO2 96%    BMI 24.47 kg/m2       General appearance: alert, cooperative, no distress, appears stated age  Neck: supple, symmetrical, trachea midline, no adenopathy, thyroid: not enlarged, symmetric, no tenderness/mass/nodules, no carotid bruit and no JVD  Lungs: clear to auscultation bilaterally  Heart: regular rate and rhythm, S1, S2 normal, no murmur, click, rub or gallop    Extremities: extremities normal, atraumatic, no cyanosis or edema  Lab Results   Component Value Date/Time    Cholesterol, total 139 08/24/2017 10:05 AM    HDL Cholesterol 42 08/24/2017 10:05 AM    LDL, calculated 78 08/24/2017 10:05 AM    VLDL, calculated 19 08/24/2017 10:05 AM    Triglyceride 95 08/24/2017 10:05 AM    CHOL/HDL Ratio 3.3 08/24/2017 10:05 AM     Lab Results   Component Value Date/Time    Hemoglobin A1c 7.5 08/24/2017 10:05 AM    Hemoglobin A1c, External 5.9 12/18/2014         ASSESSMENT and PLAN    ICD-10-CM ICD-9-CM    1. Diabetes mellitus without complication (HCC) J14.5 250.00 MICROALBUMIN, UR, RAND W/ MICROALBUMIN/CREA RATIO      HEMOGLOBIN A1C WITH EAG   2. Hypercholesterolemia E78.00 272.0 LIPID PANEL      AST      ALT   3.  Essential hypertension G82 651.3 METABOLIC PANEL, BASIC     As above,   above all stable unless otherwise noted  Labs as ordered  Continue current meds as ordered  Follow-up Disposition:  Return in about 4 months (around 5/24/2018). An After Visit Summary was printed and given to the patient. This has been fully explained to the patient, who indicates understanding.

## 2018-01-24 NOTE — PROGRESS NOTES
1. Have you been to the ER, urgent care clinic since your last visit? Hospitalized since your last visit? No    2. Have you seen or consulted any other health care providers outside of the [address] [place] [address] since your last visit? Include any pap smears or colon screening.  No

## 2018-01-31 ENCOUNTER — HOSPITAL ENCOUNTER (OUTPATIENT)
Dept: LAB | Age: 53
Discharge: HOME | End: 2018-01-31
Payer: OTHER GOVERNMENT

## 2018-01-31 DIAGNOSIS — E78.00 HYPERCHOLESTEROLEMIA: ICD-10-CM

## 2018-01-31 DIAGNOSIS — E11.9 DIABETES MELLITUS WITHOUT COMPLICATION (HCC): ICD-10-CM

## 2018-01-31 DIAGNOSIS — I10 ESSENTIAL HYPERTENSION: ICD-10-CM

## 2018-01-31 LAB
ALT SERPL-CCNC: 23 U/L (ref 16–61)
ANION GAP SERPL CALC-SCNC: 8 MMOL/L (ref 3–18)
AST SERPL-CCNC: 15 U/L (ref 15–37)
BUN SERPL-MCNC: 18 MG/DL (ref 7–18)
BUN/CREAT SERPL: 21 (ref 12–20)
CALCIUM SERPL-MCNC: 9.1 MG/DL (ref 8.5–10.1)
CHLORIDE SERPL-SCNC: 102 MMOL/L (ref 100–108)
CHOLEST SERPL-MCNC: 143 MG/DL
CO2 SERPL-SCNC: 27 MMOL/L (ref 21–32)
CREAT SERPL-MCNC: 0.86 MG/DL (ref 0.6–1.3)
CREAT UR-MCNC: 282.62 MG/DL (ref 30–125)
EST. AVERAGE GLUCOSE BLD GHB EST-MCNC: 283 MG/DL
GLUCOSE SERPL-MCNC: 271 MG/DL (ref 74–99)
HBA1C MFR BLD: 11.5 % (ref 4.2–5.6)
HDLC SERPL-MCNC: 32 MG/DL (ref 40–60)
HDLC SERPL: 4.5 {RATIO} (ref 0–5)
LDLC SERPL CALC-MCNC: 73.4 MG/DL (ref 0–100)
LIPID PROFILE,FLP: ABNORMAL
MICROALBUMIN UR-MCNC: 1.7 MG/DL (ref 0–3)
MICROALBUMIN/CREAT UR-RTO: 6 MG/G (ref 0–30)
POTASSIUM SERPL-SCNC: 4 MMOL/L (ref 3.5–5.5)
SODIUM SERPL-SCNC: 137 MMOL/L (ref 136–145)
TRIGL SERPL-MCNC: 188 MG/DL (ref ?–150)
VLDLC SERPL CALC-MCNC: 37.6 MG/DL

## 2018-01-31 PROCEDURE — 80061 LIPID PANEL: CPT | Performed by: FAMILY MEDICINE

## 2018-01-31 PROCEDURE — 83036 HEMOGLOBIN GLYCOSYLATED A1C: CPT | Performed by: FAMILY MEDICINE

## 2018-01-31 PROCEDURE — 84450 TRANSFERASE (AST) (SGOT): CPT | Performed by: FAMILY MEDICINE

## 2018-01-31 PROCEDURE — 82043 UR ALBUMIN QUANTITATIVE: CPT | Performed by: FAMILY MEDICINE

## 2018-01-31 PROCEDURE — 84460 ALANINE AMINO (ALT) (SGPT): CPT | Performed by: FAMILY MEDICINE

## 2018-01-31 PROCEDURE — 36415 COLL VENOUS BLD VENIPUNCTURE: CPT | Performed by: FAMILY MEDICINE

## 2018-01-31 PROCEDURE — 80048 BASIC METABOLIC PNL TOTAL CA: CPT | Performed by: FAMILY MEDICINE

## 2018-06-13 ENCOUNTER — HOSPITAL ENCOUNTER (OUTPATIENT)
Dept: GENERAL RADIOLOGY | Age: 53
Discharge: HOME | End: 2018-06-13
Payer: OTHER GOVERNMENT

## 2018-06-13 ENCOUNTER — HOSPITAL ENCOUNTER (OUTPATIENT)
Dept: LAB | Age: 53
Discharge: HOME | End: 2018-06-13
Payer: OTHER GOVERNMENT

## 2018-06-13 ENCOUNTER — OFFICE VISIT (OUTPATIENT)
Dept: FAMILY MEDICINE CLINIC | Age: 53
End: 2018-06-13

## 2018-06-13 VITALS
BODY MASS INDEX: 24.11 KG/M2 | OXYGEN SATURATION: 98 % | SYSTOLIC BLOOD PRESSURE: 104 MMHG | HEART RATE: 80 BPM | WEIGHT: 198 LBS | TEMPERATURE: 98.4 F | HEIGHT: 76 IN | DIASTOLIC BLOOD PRESSURE: 67 MMHG | RESPIRATION RATE: 16 BRPM

## 2018-06-13 DIAGNOSIS — I10 HTN (HYPERTENSION) WITH GOAL TO BE DETERMINED: ICD-10-CM

## 2018-06-13 DIAGNOSIS — M25.511 ACUTE PAIN OF RIGHT SHOULDER: ICD-10-CM

## 2018-06-13 DIAGNOSIS — E11.9 DIABETES MELLITUS WITHOUT COMPLICATION (HCC): Primary | ICD-10-CM

## 2018-06-13 DIAGNOSIS — E78.00 HYPERCHOLESTEROLEMIA: ICD-10-CM

## 2018-06-13 DIAGNOSIS — E11.9 DIABETES MELLITUS WITHOUT COMPLICATION (HCC): ICD-10-CM

## 2018-06-13 LAB
ALT SERPL-CCNC: 26 U/L (ref 16–61)
ANION GAP SERPL CALC-SCNC: 10 MMOL/L (ref 3–18)
AST SERPL-CCNC: 10 U/L (ref 15–37)
BUN SERPL-MCNC: 16 MG/DL (ref 7–18)
BUN/CREAT SERPL: 18 (ref 12–20)
CALCIUM SERPL-MCNC: 9.6 MG/DL (ref 8.5–10.1)
CHLORIDE SERPL-SCNC: 104 MMOL/L (ref 100–108)
CHOLEST SERPL-MCNC: 163 MG/DL
CO2 SERPL-SCNC: 23 MMOL/L (ref 21–32)
CREAT SERPL-MCNC: 0.89 MG/DL (ref 0.6–1.3)
EST. AVERAGE GLUCOSE BLD GHB EST-MCNC: 269 MG/DL
GLUCOSE SERPL-MCNC: 295 MG/DL (ref 74–99)
HBA1C MFR BLD: 11 % (ref 4.2–5.6)
HDLC SERPL-MCNC: 38 MG/DL (ref 40–60)
HDLC SERPL: 4.3 {RATIO} (ref 0–5)
LDLC SERPL CALC-MCNC: 89.4 MG/DL (ref 0–100)
LIPID PROFILE,FLP: ABNORMAL
POTASSIUM SERPL-SCNC: 4.5 MMOL/L (ref 3.5–5.5)
SODIUM SERPL-SCNC: 137 MMOL/L (ref 136–145)
TRIGL SERPL-MCNC: 178 MG/DL (ref ?–150)
VLDLC SERPL CALC-MCNC: 35.6 MG/DL

## 2018-06-13 PROCEDURE — 84460 ALANINE AMINO (ALT) (SGPT): CPT | Performed by: FAMILY MEDICINE

## 2018-06-13 PROCEDURE — 80048 BASIC METABOLIC PNL TOTAL CA: CPT | Performed by: FAMILY MEDICINE

## 2018-06-13 PROCEDURE — 80061 LIPID PANEL: CPT | Performed by: FAMILY MEDICINE

## 2018-06-13 PROCEDURE — 36415 COLL VENOUS BLD VENIPUNCTURE: CPT | Performed by: FAMILY MEDICINE

## 2018-06-13 PROCEDURE — 73030 X-RAY EXAM OF SHOULDER: CPT

## 2018-06-13 PROCEDURE — 83036 HEMOGLOBIN GLYCOSYLATED A1C: CPT | Performed by: FAMILY MEDICINE

## 2018-06-13 PROCEDURE — 84450 TRANSFERASE (AST) (SGOT): CPT | Performed by: FAMILY MEDICINE

## 2018-06-13 NOTE — MR AVS SNAPSHOT
[address] [place] [address] 
 
 
 [address] [address] [address] [facility] [id number] [phone] Patient: Shin Mattson MRN: [id number] :1965 Visit Information Date & Time Provider Department Dept. Phone Encounter #  
 2018  8:20 AM Aspen Ontiveros, [address] [place] [address] [phone] [id number] Follow-up Instructions Return in about 4 months (around 10/13/2018) for dm/htn/chol. Upcoming Health Maintenance Date Due  
 EYE EXAM RETINAL OR DILATED Q1 10/25/2018* HEMOGLOBIN A1C Q6M 2018 Influenza Age 5 to Adult 2018 FOOT EXAM Q1 2018 MICROALBUMIN Q1 2019 LIPID PANEL Q1 2019 Pneumococcal 19-64 Highest Risk (3 of 3 - PPSV23) 10/4/2021 DTaP/Tdap/Td series (2 - Td) 2025 *Topic was postponed. The date shown is not the original due date. Allergies as of 2018  Review Complete On: 2018 By: Aspen Ontiveros MD  
 No Known Allergies Current Immunizations  Reviewed on 10/22/2015 Name Date Influenza Vaccine 2015, 10/18/2014, 10/2/2013 Pneumococcal Conjugate (PCV-13) 2016 Tdap 2015 Zoster Vaccine, Live 2015 Not reviewed this visit You Were Diagnosed With   
  
 Codes Comments Diabetes mellitus [facility] complication ([facility]    -  Primary ICD-10-CM: E11.9 ICD-9-CM: 250.00 HTN (hypertension) with goal to be determined     ICD-10-CM: I10 
ICD-9-CM: 401.9 Hypercholesterolemia     ICD-10-CM: E78.00 ICD-9-CM: 272.0 Acute pain of right shoulder     ICD-10-CM: M25.511 ICD-9-CM: 719.41 Vitals BP Pulse Temp Resp Height(growth percentile) Weight(growth percentile) 104/67 (BP 1 Location: Left arm, BP Patient Position: Sitting) 80 98.4 °F (36.9 °C) (Oral) 16 6' 4\" (1.93 m) 198 lb (89.8 kg) SpO2 BMI Smoking Status 98% 24.1 kg/m2 Former Smoker BMI and BSA Data Body Mass Index Body Surface Area 24.1 kg/m 2 2.19 m 2 Preferred Pharmacy Pharmacy Name Phone [address] [facility] [address], [facility] [facility] [address] [phone] Your Updated Medication List  
  
   
This list is accurate as of 6/13/18  9:04 AM.  Always use your most recent med list.  
  
  
  
  
 aspirin delayed-release 81 mg tablet Take 1 Tab by mouth daily. Blood-Glucose Meter monitoring kit Blood sugar check daily CINNAMON 500 mg Cap Generic drug:  cinnamon bark Take  by mouth. FISH  mg Cap Generic drug:  Omega-3 Fatty Acids Take  by mouth.  
  
 gabapentin 300 mg capsule Commonly known as:  NEURONTIN Take 300 mg by mouth daily. glucose blood VI test strips strip Commonly known as:  ASCENSIA AUTODISC VI, ONE TOUCH ULTRA TEST VI Check blood glucose daily. insulin glargine 100 unit/mL (3 mL) Inpn Commonly known as:  LANTUS,BASAGLAR  
35 units subcu once daily as directed ( per [place]) Insulin Needles (Disposable) 31 gauge x 3/16\" Ndle Commonly known as:  BD ULTRA-FINE MINI PEN NEEDLE To be used as directed with Solostar Lantus pen. * Lancets Misc Check blood glucose daily * Lancets Misc Blood sugar check daily  
  
 metFORMIN 500 mg tablet Commonly known as:  GLUCOPHAGE Take 1 Tab by mouth two (2) times daily (with meals). [address] Take  by mouth.  
  
 multivitamin tablet Commonly known as:  ONE A DAY Take 1 Tab by mouth daily. sildenafil citrate 50 mg tablet Commonly known as:  VIAGRA Take 1 Tab by mouth as needed. simvastatin 40 mg tablet Commonly known as:  ZOCOR  
TAKE 1 TABLET NIGHTLY * Notice: This list has 2 medication(s) that are the same as other medications prescribed for you. Read the directions carefully, and ask your doctor or other care provider to review them with you. We Performed the Following REFERRAL TO OPHTHALMOLOGY [REF57 Custom] Follow-up Instructions Return in about 4 months (around 10/13/2018) for dm/htn/chol. To-Do List   
 06/13/2018 Lab:  ALT   
  
 06/13/2018 Lab:  AST   
  
 06/13/2018 Lab:  HEMOGLOBIN A1C WITH EAG   
  
 06/13/2018 Lab:  LIPID PANEL   
  
 06/13/2018 Lab:  METABOLIC PANEL, BASIC   
  
 06/20/2018 Imaging:  XR SHOULDER RT AP/LAT MIN 2 V Referral Information Referral ID Referred By Referred To  
  
 [id number] Nav Hummel MD   
   [address] [place] [address], [address] Phone: [phone] Fax: [phone] Visits Status Start Date End Date 1 New Request 6/13/18 6/13/19 If your referral has a status of pending review or denied, additional information will be sent to support the outcome of this decision. Patient Instructions Learning About Diabetes Food Guidelines Your Care Instructions Meal planning is important to manage diabetes. It helps keep your blood sugar at a target level (which you set with your doctor). You don't have to eat special foods. You can eat what your family eats, including sweets once in a while. But you do have to pay attention to how often you eat and how much you eat of certain foods. You may want to work with a dietitian or a certified diabetes educator (CDE) to help you plan meals and snacks. A dietitian or CDE can also help you lose weight if that is one of your goals. What should you know about eating carbs? Managing the amount of carbohydrate (carbs) you eat is an important part of healthy meals when you have diabetes. Carbohydrate is found in many foods. · Learn which foods have carbs. And learn the amounts of carbs in different foods. ¨ Bread, cereal, pasta, and rice have about 15 grams of carbs in a serving.  A serving is 1 slice of bread (1 ounce), ½ cup of cooked cereal, or 1/3 cup of cooked pasta or rice. ¨ Fruits have 15 grams of carbs in a serving. A serving is 1 small fresh fruit, such as an apple or orange; ½ of a banana; ½ cup of cooked or canned fruit; ½ cup of fruit juice; 1 cup of melon or raspberries; or 2 tablespoons of dried fruit. ¨ Milk and no-sugar-added yogurt have 15 grams of carbs in a serving. A serving is 1 cup of milk or 2/3 cup of no-sugar-added yogurt. ¨ Starchy vegetables have 15 grams of carbs in a serving. A serving is ½ cup of mashed potatoes or sweet potato; 1 cup winter squash; ½ of a small baked potato; ½ cup of cooked beans; or ½ cup cooked corn or green peas. · Learn how much carbs to eat each day and at each meal. A dietitian or CDE can teach you how to keep track of the amount of carbs you eat. This is called carbohydrate counting. · If you are not sure how to count carbohydrate grams, use the Plate Method to plan meals. It is a good, quick way to make sure that you have a balanced meal. It also helps you spread carbs throughout the day. ¨ Divide your plate by types of foods. Put non-starchy vegetables on half the plate, meat or other protein food on one-quarter of the plate, and a grain or starchy vegetable in the final quarter of the plate. To this you can add a small piece of fruit and 1 cup of milk or yogurt, depending on how many carbs you are supposed to eat at a meal. 
· Try to eat about the same amount of carbs at each meal. Do not \"save up\" your daily allowance of carbs to eat at one meal. 
· Proteins have very little or no carbs per serving. Examples of proteins are beef, chicken, turkey, fish, eggs, tofu, cheese, cottage cheese, and peanut butter. A serving size of meat is 3 ounces, which is about the size of a deck of cards. Examples of meat substitute serving sizes (equal to 1 ounce of meat) are 1/4 cup of cottage cheese, 1 egg, 1 tablespoon of peanut butter, and ½ cup of tofu. How can you eat out and still eat healthy? · Learn to estimate the serving sizes of foods that have carbohydrate. If you measure food at home, it will be easier to estimate the amount in a serving of restaurant food. · If the meal you order has too much carbohydrate (such as potatoes, corn, or baked beans), ask to have a low-carbohydrate food instead. Ask for a salad or green vegetables. · If you use insulin, check your blood sugar before and after eating out to help you plan how much to eat in the future. · If you eat more carbohydrate at a meal than you had planned, take a walk or do other exercise. This will help lower your blood sugar. What else should you know? · Limit saturated fat, such as the fat from meat and dairy products. This is a healthy choice because people who have diabetes are at higher risk of heart disease. So choose lean cuts of meat and nonfat or low-fat dairy products. Use olive or canola oil instead of butter or shortening when cooking. · Don't skip meals. Your blood sugar may drop too low if you skip meals and take insulin or certain medicines for diabetes. · Check with your doctor before you drink alcohol. Alcohol can cause your blood sugar to drop too low. Alcohol can also cause a bad reaction if you take certain diabetes medicines. Follow-up care is a key part of your treatment and safety. Be sure to make and go to all appointments, and call your doctor if you are having problems. It's also a good idea to know your test results and keep a list of the medicines you take. Where can you learn more? Go to http://marie-whitney.info/. Enter D490 in the search box to learn more about \"Learning About Diabetes Food Guidelines. \" Current as of: March 13, 2017 Content Version: 11.4 © 3435-3384 [employer] (by [employer]). Care instructions adapted under license by [employer] (which disclaims liability or warranty for this information).  If you have questions about a medical condition or this instruction, always ask your healthcare professional. [address]  any warranty or liability for your use of this information. Introducing [facility]! Dear Kim Herron: 
Thank you for requesting a Noonswoon account. Our records indicate that you already have an active Noonswoon account. You can access your account anytime at [email or web address] Did you know that you can access your hospital and ER discharge instructions at any time in Noonswoon? You can also review all of your test results from your hospital stay or ER visit. Additional Information If you have questions, please visit the Frequently Asked Questions section of the Noonswoon website at [email or web address] Remember, Noonswoon is NOT to be used for urgent needs. For medical emergencies, dial 911. Now available from your iPhone and Android! Please provide this summary of care documentation to your next provider. Your primary care clinician is listed as [address]. If you have any questions after today's visit, please call [phone].

## 2018-06-13 NOTE — PROGRESS NOTES
1. Have you been to the ER, urgent care clinic since your last visit? Hospitalized since your last visit? No    2. Have you seen or consulted any other health care providers outside of the [address] [place] [address] since your last visit? Include any pap smears or colon screening.  No

## 2018-06-13 NOTE — PATIENT INSTRUCTIONS

## 2018-06-13 NOTE — PROGRESS NOTES
HISTORY OF PRESENT ILLNESS  Boone Mari is a 46 y.o. male. HPI  Patient is here today for evaluation and treatment of: Diabetes/Hypertension/Cholesterol/Shoulder Pain    Diabetes:  Pt is fasting today. ; last A1c was elevated. He has lost weight. ; needs an ophthalmology exam    Lab Results   Component Value Date/Time    Hemoglobin A1c 11.5 (H) 01/31/2018 07:17 AM    Hemoglobin A1c, External 5.9 12/18/2014         Hypertension: BP is stable;  Pt is exercising; he has lost some weight    Wt Readings from Last 3 Encounters:   06/13/18 198 lb (89.8 kg)   01/24/18 201 lb (91.2 kg)   08/24/17 211 lb (95.7 kg)          Cholesterol:  Pt is on zocor. Attempts a lower cholesterol diet. Right Shoulder Pain: pt states that on May 5th he went skating; he fell and braced his shoulder ; but with movement sx worsened. He has popping in shoulder. When he pushes out and raises shoulder; the shoulder hurts. Current Outpatient Prescriptions:     insulin glargine (LANTUS,BASAGLAR) 100 unit/mL (3 mL) inpn, 35 units subcu once daily as directed ( per Mathieu), Disp: 3 Adjustable Dose Pre-filled Pen Syringe, Rfl: 6    aspirin delayed-release 81 mg tablet, Take 1 Tab by mouth daily. , Disp: 90 Tab, Rfl: 3    Insulin Needles, Disposable, (BD INSULIN PEN NEEDLE UF MINI) 31 gauge x 3/16\" ndle, To be used as directed with Solostar Lantus pen., Disp: 90 Pen Needle, Rfl: 3    simvastatin (ZOCOR) 40 mg tablet, TAKE 1 TABLET NIGHTLY, Disp: 90 Tab, Rfl: 3    sildenafil citrate (VIAGRA) 50 mg tablet, Take 1 Tab by mouth as needed. , Disp: 4 Tab, Rfl: 6    metFORMIN (GLUCOPHAGE) 500 mg tablet, Take 1 Tab by mouth two (2) times daily (with meals). , Disp: 180 Tab, Rfl: 3    cinnamon bark (CINNAMON) 500 mg cap, Take  by mouth., Disp: , Rfl:     Blood-Glucose Meter monitoring kit, Blood sugar check daily, Disp: 1 Kit, Rfl: 0    glucose blood VI test strips (ASCENSIA AUTODISC VI, ONE TOUCH ULTRA TEST VI) strip, Check blood glucose daily. , Disp: 100 Strip, Rfl: 4    Lancets misc, Blood sugar check daily, Disp: 1 Each, Rfl: 11    GLUCOSAM/CHOND/HYALU/CF BORATE (MOVE FREE [facility] PO), Take  by mouth., Disp: , Rfl:     FISH  mg cap, Take  by mouth., Disp: , Rfl:     Lancets misc, Check blood glucose daily, Disp: 3 Package, Rfl: 3    multivitamin (ONE A DAY) tablet, Take 1 Tab by mouth daily. , Disp: , Rfl:     gabapentin (NEURONTIN) 300 mg capsule, Take 300 mg by mouth daily. , Disp: , Rfl:       PMH,  Meds, Allergies, Family History, Social history reviewed    Review of Systems   Constitutional: Negative for chills and fever. Respiratory: Negative for shortness of breath and wheezing. Cardiovascular: Negative for chest pain and leg swelling. Physical Exam   Constitutional: He appears well-developed and well-nourished. No distress. Cardiovascular: Normal rate and regular rhythm. Exam reveals no gallop and no friction rub. No murmur heard. Pulmonary/Chest: Breath sounds normal. No respiratory distress. He has no wheezes. He has no rales. Musculoskeletal: He exhibits tenderness (anterior and posterior shoulder;  ROM with TTP). Neurological: He is alert. Nursing note and vitals reviewed.      Visit Vitals    /67 (BP 1 Location: Left arm, BP Patient Position: Sitting)    Pulse 80    Temp 98.4 °F (36.9 °C) (Oral)    Resp 16    Ht 6' 4\" (1.93 m)    Wt 198 lb (89.8 kg)    SpO2 98%    BMI 24.1 kg/m2       Lab Results   Component Value Date/Time    Cholesterol, total 143 01/31/2018 07:17 AM    HDL Cholesterol 32 (L) 01/31/2018 07:17 AM    LDL, calculated 73.4 01/31/2018 07:17 AM    VLDL, calculated 37.6 01/31/2018 07:17 AM    Triglyceride 188 (H) 01/31/2018 07:17 AM    CHOL/HDL Ratio 4.5 01/31/2018 07:17 AM     Lab Results   Component Value Date/Time    WBC 10.4 09/11/2012 10:35 AM    HGB 13.8 09/11/2012 10:35 AM    HCT 41.9 09/11/2012 10:35 AM    PLATELET 444 62/38/9370 10:35 AM    [facility] [place]11/2012 10:35 AM     Lab Results   Component Value Date/Time    Hemoglobin A1c 11.5 (H) 01/31/2018 07:17 AM    Hemoglobin A1c, External 5.9 12/18/2014       ASSESSMENT and PLAN    ICD-10-CM ICD-9-CM    1. Diabetes mellitus without complication (HCC) F75.9 250.00 HEMOGLOBIN A1C WITH EAG      AST      ALT      REFERRAL TO OPHTHALMOLOGY   2. HTN (hypertension) with goal to be determined O16 940.5 METABOLIC PANEL, BASIC   3. Hypercholesterolemia E78.00 272.0 LIPID PANEL   4. Acute pain of right shoulder- new M25.511 719.41 XR SHOULDER RT AP/LAT MIN 2 V       As above,   above all stable unless otherwise noted  Labs as ordered  Continue current meds as ordered  Continue aleve as needed for shoulder pain; prudent use advised. Follow-up Disposition:  Return in about 4 months (around 10/13/2018) for dm/htn/chol. An After Visit Summary was printed and given to the patient. This has been fully explained to the patient, who indicates understanding.

## 2018-06-15 PROBLEM — E11.40 TYPE 2 DIABETES MELLITUS WITH DIABETIC NEUROPATHY (HCC): Status: ACTIVE | Noted: 2018-06-15

## 2018-08-27 RX ORDER — SIMVASTATIN 40 MG/1
TABLET, FILM COATED ORAL
Qty: 90 TAB | Refills: 3 | Status: SHIPPED | OUTPATIENT
Start: 2018-08-27

## 2018-08-27 RX ORDER — METFORMIN HYDROCHLORIDE 500 MG/1
TABLET ORAL
Qty: 180 TAB | Refills: 3 | Status: SHIPPED | OUTPATIENT
Start: 2018-08-27

## 2019-04-01 ENCOUNTER — TELEPHONE (OUTPATIENT)
Dept: FAMILY MEDICINE CLINIC | Age: 54
End: 2019-04-01

## 2019-04-01 NOTE — TELEPHONE ENCOUNTER
Placed call to patient to schedule a DM/htn/chol follow up with Dr. Collin Barriga. Left a non-detailed message requesting a return call to the office. The 5th Quartert message also sent.

## 2019-05-07 ENCOUNTER — TELEPHONE (OUTPATIENT)
Dept: FAMILY MEDICINE CLINIC | Age: 54
End: 2019-05-07

## 2019-05-07 NOTE — LETTER
5/7/2019 1:48 PM 
 
Mr. Brice Wood [address] [address] Dear Mr. Fredy Coker missed you! Please call our office at [phone] and schedule a follow up appointment for your continued care. Sincerely, Gina Kuo LPN

## 2019-05-07 NOTE — TELEPHONE ENCOUNTER
Placed call to patient to schedule diabetes follow up with Dr. Daria Kaur. Left a non-detailed message requesting patient return call to the office. Per previous note, patient has not been seen by endocrinology. If patient returns call please schedule.

## 2019-05-17 ENCOUNTER — TELEPHONE (OUTPATIENT)
Dept: FAMILY MEDICINE CLINIC | Age: 54
End: 2019-05-17

## 2019-05-17 NOTE — TELEPHONE ENCOUNTER
Placed call to patient to schedule dm/htn/chol follow up. A non-detailed message was left requesting pt return call to the office.  Third attempt

## 2019-08-05 ENCOUNTER — TELEPHONE (OUTPATIENT)
Dept: FAMILY MEDICINE CLINIC | Age: 54
End: 2019-08-05

## 2019-08-05 NOTE — TELEPHONE ENCOUNTER
Place call to patient in efforts to schedule a DM follow up with provider. A non-detailed message was left on both home and mobile numbers requesting patient return call to the office. If patient returns call please schedule.

## 2022-03-18 PROBLEM — E11.40 TYPE 2 DIABETES MELLITUS WITH DIABETIC NEUROPATHY (HCC): Status: ACTIVE | Noted: 2018-06-15

## 2022-05-10 ENCOUNTER — OFFICE VISIT (OUTPATIENT)
Dept: ORTHOPEDIC SURGERY | Age: 57
End: 2022-05-10
Payer: OTHER GOVERNMENT

## 2022-05-10 VITALS — BODY MASS INDEX: 24.11 KG/M2 | WEIGHT: 198 LBS | OXYGEN SATURATION: 99 % | HEART RATE: 97 BPM | HEIGHT: 76 IN

## 2022-05-10 DIAGNOSIS — S43.51XA ACROMIOCLAVICULAR SPRAIN, RIGHT, INITIAL ENCOUNTER: ICD-10-CM

## 2022-05-10 DIAGNOSIS — S22.32XA CLOSED FRACTURE OF ONE RIB OF LEFT SIDE, INITIAL ENCOUNTER: ICD-10-CM

## 2022-05-10 DIAGNOSIS — S60.211A CONTUSION OF RIGHT WRIST, INITIAL ENCOUNTER: ICD-10-CM

## 2022-05-10 DIAGNOSIS — S32.302A CLOSED NONDISPLACED FRACTURE OF LEFT ILIUM, UNSPECIFIED FRACTURE MORPHOLOGY, INITIAL ENCOUNTER (HCC): ICD-10-CM

## 2022-05-10 DIAGNOSIS — M25.511 ACUTE PAIN OF RIGHT SHOULDER: Primary | ICD-10-CM

## 2022-05-10 DIAGNOSIS — M25.631 DECREASED RANGE OF MOTION OF RIGHT WRIST: ICD-10-CM

## 2022-05-10 DIAGNOSIS — S06.0X9A: ICD-10-CM

## 2022-05-10 DIAGNOSIS — M25.611 DECREASED RANGE OF MOTION OF RIGHT SHOULDER: ICD-10-CM

## 2022-05-10 DIAGNOSIS — S40.021A CONTUSION, SHOULDER AND UPPER ARM, MULTIPLE SITES, RIGHT, INITIAL ENCOUNTER: ICD-10-CM
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
## 2022-05-10 DIAGNOSIS — G56.01 CARPAL TUNNEL SYNDROME OF RIGHT WRIST: ICD-10-CM

## 2022-05-10 DIAGNOSIS — S05.42XA LACERATION OF LEFT ORBIT, INITIAL ENCOUNTER: ICD-10-CM

## 2022-05-10 DIAGNOSIS — S40.011A CONTUSION, SHOULDER AND UPPER ARM, MULTIPLE SITES, RIGHT, INITIAL ENCOUNTER: ICD-10-CM

## 2022-05-10 PROCEDURE — 73030 X-RAY EXAM OF SHOULDER: CPT | Performed by: PHYSICIAN ASSISTANT

## 2022-05-10 PROCEDURE — 99204 OFFICE O/P NEW MOD 45 MIN: CPT | Performed by: PHYSICIAN ASSISTANT

## 2022-05-10 RX ORDER — SITAGLIPTIN AND METFORMIN HYDROCHLORIDE 50; 500 MG/1; MG/1
TABLET, FILM COATED, EXTENDED RELEASE ORAL
COMMUNITY
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
## 2022-05-31 ENCOUNTER — DOCUMENTATION ONLY (OUTPATIENT)
Dept: ORTHOPEDIC SURGERY | Age: 57
End: 2022-05-31

## 2022-06-01 ENCOUNTER — HOSPITAL ENCOUNTER (OUTPATIENT)
Dept: PHYSICAL THERAPY | Age: 57
Discharge: HOME | End: 2022-06-01
Payer: OTHER GOVERNMENT

## 2022-06-01 PROCEDURE — 97530 THERAPEUTIC ACTIVITIES: CPT

## 2022-06-01 PROCEDURE — 97162 PT EVAL MOD COMPLEX 30 MIN: CPT

## 2022-06-01 PROCEDURE — 97110 THERAPEUTIC EXERCISES: CPT
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
## 2022-06-02 ENCOUNTER — OFFICE VISIT (OUTPATIENT)
Dept: ORTHOPEDIC SURGERY | Age: 57
End: 2022-06-02
Payer: OTHER GOVERNMENT

## 2022-06-02 VITALS
HEART RATE: 86 BPM | OXYGEN SATURATION: 98 % | TEMPERATURE: 96.9 F | HEIGHT: 76 IN | BODY MASS INDEX: 24.11 KG/M2 | WEIGHT: 198 LBS

## 2022-06-02 DIAGNOSIS — M54.50 LUMBAR PAIN: ICD-10-CM

## 2022-06-02 DIAGNOSIS — R20.0 NUMBNESS AND TINGLING OF RIGHT ARM: ICD-10-CM

## 2022-06-02 DIAGNOSIS — S43.401A SPRAIN OF RIGHT SHOULDER, UNSPECIFIED SHOULDER SPRAIN TYPE, INITIAL ENCOUNTER: ICD-10-CM

## 2022-06-02 DIAGNOSIS — M25.511 ACUTE PAIN OF BOTH SHOULDERS: ICD-10-CM

## 2022-06-02 DIAGNOSIS — S32.592A CLOSED FRACTURE OF RAMUS OF LEFT PUBIS, INITIAL ENCOUNTER (HCC): ICD-10-CM

## 2022-06-02 DIAGNOSIS — M54.2 CERVICAL PAIN: ICD-10-CM

## 2022-06-02 DIAGNOSIS — M25.512 ACUTE PAIN OF BOTH SHOULDERS: ICD-10-CM

## 2022-06-02 DIAGNOSIS — S22.21XA FRACTURE OF MANUBRIUM, INITIAL ENCOUNTER FOR CLOSED FRACTURE: ICD-10-CM

## 2022-06-02 DIAGNOSIS — S43.402A SPRAIN OF LEFT SHOULDER, UNSPECIFIED SHOULDER SPRAIN TYPE, INITIAL ENCOUNTER: ICD-10-CM

## 2022-06-02 DIAGNOSIS — S22.039A CLOSED FRACTURE OF THIRD THORACIC VERTEBRA, UNSPECIFIED FRACTURE MORPHOLOGY, INITIAL ENCOUNTER (HCC): ICD-10-CM

## 2022-06-02 DIAGNOSIS — S13.9XXA NECK SPRAIN, INITIAL ENCOUNTER: ICD-10-CM

## 2022-06-02 DIAGNOSIS — S39.012A STRAIN OF LUMBAR REGION, INITIAL ENCOUNTER: ICD-10-CM

## 2022-06-02 DIAGNOSIS — S22.32XA CLOSED FRACTURE OF ONE RIB OF LEFT SIDE, INITIAL ENCOUNTER: ICD-10-CM

## 2022-06-02 DIAGNOSIS — S01.81XA FACIAL LACERATION, INITIAL ENCOUNTER: ICD-10-CM

## 2022-06-02 DIAGNOSIS — R20.2 NUMBNESS AND TINGLING OF RIGHT ARM: ICD-10-CM

## 2022-06-02 DIAGNOSIS — S22.049A CLOSED FRACTURE OF FOURTH THORACIC VERTEBRA, UNSPECIFIED FRACTURE MORPHOLOGY, INITIAL ENCOUNTER (HCC): ICD-10-CM

## 2022-06-02 DIAGNOSIS — M51.36 DDD (DEGENERATIVE DISC DISEASE), LUMBAR: ICD-10-CM

## 2022-06-02 DIAGNOSIS — M25.511 ACUTE PAIN OF RIGHT SHOULDER: ICD-10-CM

## 2022-06-02 DIAGNOSIS — S32.302A CLOSED NONDISPLACED FRACTURE OF LEFT ILIUM, UNSPECIFIED FRACTURE MORPHOLOGY, INITIAL ENCOUNTER (HCC): Primary | ICD-10-CM

## 2022-06-02 DIAGNOSIS — S16.1XXA STRAIN OF NECK MUSCLE, INITIAL ENCOUNTER: ICD-10-CM

## 2022-06-02 DIAGNOSIS — S02.85XA CLOSED FRACTURE OF LEFT ORBIT, INITIAL ENCOUNTER (HCC): ICD-10-CM

## 2022-06-02 PROCEDURE — 99214 OFFICE O/P EST MOD 30 MIN: CPT | Performed by: SPECIALIST

## 2022-06-02 NOTE — Clinical Note
NOTIFICATION RETURN TO WORK / SCHOOL    6/2/2022 9:56 AM    Mr. Lakshmi Ignacio  [address] [place] [id number]      To Whom It May Concern:    Lakshmi Ignacio is currently under the care of [address] [place] [address]. He will return to work/school on: ***    If there are questions or concerns please have the patient contact our office.         Sincerely,      Carol Mederos MD
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
## 2022-06-02 NOTE — LETTER
NOTIFICATION RETURN TO WORK     6/2/2022 9:57 AM    Mr. Camilo Callahan  [address] [place] [id number]      To Whom It May Concern:    Camilo Callahan is currently under the care of [address] [place] [address]. He will return to full work on 7-5-22. Please excuse him from work until then. If there are questions or concerns please have the patient contact our office.         Sincerely,        Patricia Hein MD

## 2022-06-06 ENCOUNTER — TELEPHONE (OUTPATIENT)
Dept: PHYSICAL THERAPY | Age: 57
End: 2022-06-06

## 2022-06-14 ENCOUNTER — APPOINTMENT (OUTPATIENT)
Dept: PHYSICAL THERAPY | Age: 57
End: 2022-06-14
Payer: OTHER GOVERNMENT

## 2022-06-30 ENCOUNTER — TELEPHONE (OUTPATIENT)
Dept: PHYSICAL THERAPY | Age: 57
End: 2022-06-30
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
## 2023-02-14 DIAGNOSIS — M25.611 DECREASED RANGE OF MOTION OF RIGHT SHOULDER: ICD-10-CM

## 2023-02-14 DIAGNOSIS — M25.511 ACUTE PAIN OF RIGHT SHOULDER: Primary | ICD-10-CM
